# Patient Record
Sex: FEMALE | Race: ASIAN | NOT HISPANIC OR LATINO | URBAN - METROPOLITAN AREA
[De-identification: names, ages, dates, MRNs, and addresses within clinical notes are randomized per-mention and may not be internally consistent; named-entity substitution may affect disease eponyms.]

---

## 2022-07-19 ENCOUNTER — OUTPATIENT (OUTPATIENT)
Dept: OUTPATIENT SERVICES | Facility: HOSPITAL | Age: 62
LOS: 1 days | End: 2022-07-19
Payer: COMMERCIAL

## 2022-07-19 VITALS
SYSTOLIC BLOOD PRESSURE: 160 MMHG | WEIGHT: 145.95 LBS | HEIGHT: 60 IN | TEMPERATURE: 99 F | OXYGEN SATURATION: 97 % | HEART RATE: 73 BPM | RESPIRATION RATE: 16 BRPM | DIASTOLIC BLOOD PRESSURE: 89 MMHG

## 2022-07-19 DIAGNOSIS — E11.9 TYPE 2 DIABETES MELLITUS WITHOUT COMPLICATIONS: ICD-10-CM

## 2022-07-19 DIAGNOSIS — D48.1 NEOPLASM OF UNCERTAIN BEHAVIOR OF CONNECTIVE AND OTHER SOFT TISSUE: ICD-10-CM

## 2022-07-19 DIAGNOSIS — Z01.818 ENCOUNTER FOR OTHER PREPROCEDURAL EXAMINATION: ICD-10-CM

## 2022-07-19 DIAGNOSIS — M25.774 OSTEOPHYTE, RIGHT FOOT: ICD-10-CM

## 2022-07-19 DIAGNOSIS — F41.9 ANXIETY DISORDER, UNSPECIFIED: ICD-10-CM

## 2022-07-19 DIAGNOSIS — I10 ESSENTIAL (PRIMARY) HYPERTENSION: ICD-10-CM

## 2022-07-19 LAB
ANION GAP SERPL CALC-SCNC: 11 MMOL/L — SIGNIFICANT CHANGE UP (ref 5–17)
BUN SERPL-MCNC: 17 MG/DL — SIGNIFICANT CHANGE UP (ref 7–23)
CALCIUM SERPL-MCNC: 9.7 MG/DL — SIGNIFICANT CHANGE UP (ref 8.4–10.5)
CHLORIDE SERPL-SCNC: 103 MMOL/L — SIGNIFICANT CHANGE UP (ref 96–108)
CO2 SERPL-SCNC: 26 MMOL/L — SIGNIFICANT CHANGE UP (ref 22–31)
CREAT SERPL-MCNC: 0.78 MG/DL — SIGNIFICANT CHANGE UP (ref 0.5–1.3)
EGFR: 86 ML/MIN/1.73M2 — SIGNIFICANT CHANGE UP
GLUCOSE SERPL-MCNC: 76 MG/DL — SIGNIFICANT CHANGE UP (ref 70–99)
HCT VFR BLD CALC: 35.7 % — SIGNIFICANT CHANGE UP (ref 34.5–45)
HGB BLD-MCNC: 12.4 G/DL — SIGNIFICANT CHANGE UP (ref 11.5–15.5)
MCHC RBC-ENTMCNC: 33 PG — SIGNIFICANT CHANGE UP (ref 27–34)
MCHC RBC-ENTMCNC: 34.7 GM/DL — SIGNIFICANT CHANGE UP (ref 32–36)
MCV RBC AUTO: 94.9 FL — SIGNIFICANT CHANGE UP (ref 80–100)
NRBC # BLD: 0 /100 WBCS — SIGNIFICANT CHANGE UP (ref 0–0)
PLATELET # BLD AUTO: 279 K/UL — SIGNIFICANT CHANGE UP (ref 150–400)
POTASSIUM SERPL-MCNC: 3.7 MMOL/L — SIGNIFICANT CHANGE UP (ref 3.5–5.3)
POTASSIUM SERPL-SCNC: 3.7 MMOL/L — SIGNIFICANT CHANGE UP (ref 3.5–5.3)
RBC # BLD: 3.76 M/UL — LOW (ref 3.8–5.2)
RBC # FLD: 12.6 % — SIGNIFICANT CHANGE UP (ref 10.3–14.5)
SODIUM SERPL-SCNC: 140 MMOL/L — SIGNIFICANT CHANGE UP (ref 135–145)
WBC # BLD: 7.96 K/UL — SIGNIFICANT CHANGE UP (ref 3.8–10.5)
WBC # FLD AUTO: 7.96 K/UL — SIGNIFICANT CHANGE UP (ref 3.8–10.5)

## 2022-07-19 PROCEDURE — 80048 BASIC METABOLIC PNL TOTAL CA: CPT

## 2022-07-19 PROCEDURE — G0463: CPT

## 2022-07-19 PROCEDURE — 93010 ELECTROCARDIOGRAM REPORT: CPT | Mod: NC

## 2022-07-19 PROCEDURE — 93005 ELECTROCARDIOGRAM TRACING: CPT

## 2022-07-19 PROCEDURE — 85027 COMPLETE CBC AUTOMATED: CPT

## 2022-07-19 PROCEDURE — 36415 COLL VENOUS BLD VENIPUNCTURE: CPT

## 2022-07-19 RX ORDER — TELMISARTAN 20 MG/1
1 TABLET ORAL
Qty: 0 | Refills: 0 | DISCHARGE

## 2022-07-19 NOTE — H&P PST ADULT - FALL HARM RISK - UNIVERSAL INTERVENTIONS
Bed in lowest position, wheels locked, appropriate side rails in place/Call bell, personal items and telephone in reach/Instruct patient to call for assistance before getting out of bed or chair/Non-slip footwear when patient is out of bed/[place] to call system/Physically safe environment - no spills, clutter or unnecessary equipment/Purposeful Proactive Rounding/Room/bathroom lighting operational, light cord in reach

## 2022-07-19 NOTE — H&P PST ADULT - LAST STRESS TEST
Health Maintenance Due   Topic Date Due   • Diabetes Eye Exam  01/06/1964   • DTaP/Tdap/Td Vaccine (1 - Tdap) 01/06/1965   • Shingles Vaccine (1 of 2) 01/06/1996   • Breast Cancer Screening  03/25/2018   • Colorectal Cancer Screening-Fecal Occult Blood  05/15/2018   • Diabetes Foot Exam  05/18/2018   • Lung Cancer Screening  10/19/2018   • Medicare Wellness 65+  04/27/2019       Patient is due for the topics as listed above and wishes to discuss with provider.  Order is already placed for FOBT and Mammogram.  
Subjective:      Shani Braswell is a 72 year old female who presents for evaluation of symptoms of a URI, possible sinusitis, bronchitis . Symptoms include congestion, nasal congestion, no  fever and productive cough with  green colored sputum. Onset of symptoms was 1 weeks ago, and has been been unchanged since that time. Treatment to date: cough drops .    Patient's medications, allergies, past medical, surgical, social and family histories were reviewed and updated as appropriate.  Continues to smoke   Ears feels clogged and cannot hear     Review of Systems  No fevers overnight  No chest pain or shortness of breath    Objective:     Visit Vitals  /72   Pulse 66   Temp 97.4 °F (36.3 °C) (Oral)   Ht 5' 2\" (1.575 m)   Wt 82.2 kg   SpO2 96%   BMI 33.15 kg/m²     General appearance: alert  Ears: abnormal tympanic membrane right ear - erythematous, dull and retracted and abnormal tympanic membrane left ear - diminished mobility, erythematous, dull and retracted  Nose: Nares normal. Septum midline. Mucosa normal. No drainage or sinus tenderness.  Throat: lips, mucosa, and tongue normal; teeth and gums normal  Neck: mild anterior cervical adenopathy, no carotid bruit, no JVD, supple, symmetrical, trachea midline and thyroid not enlarged, symmetric, no tenderness/mass/nodules  Lungs: clear to auscultation bilaterally  Heart: regular rate and rhythm, S1, S2 normal, no murmur, click, rub or gallop    Assessment:     otitis media, sinusitis and viral upper respiratory illness  Allergy to pcn ( throat closed up )  Plan:   zpak   mucinex   Suggested symptomatic OTC remedies.  Nasal saline spray for congestion.  Follow up as needed.  
denies

## 2022-07-19 NOTE — H&P PST ADULT - HISTORY OF PRESENT ILLNESS
61yo female with medical h/o HTN, HDL and T2DM, reports Foot mass, right and presents today for PST for scheduled Excision Soft Tissue Mass Right Foot on 7/29/2022

## 2022-07-19 NOTE — H&P PST ADULT - PROBLEM SELECTOR PLAN 1
Pre-op instructions given. Pt verbalized understanding  Chlorhexidine wash instructions given  Accu-Chek ordered STAT for day of procedure  Pending: M/C   Pending: Covidpcr test/results

## 2022-07-19 NOTE — H&P PST ADULT - VENOUS THROMBOEMBOLISM CURRENT STATUS
No
(1) other risk factor (includes escalating BMI, pack-years of smoking, diabetes requiring insulin, chemotherapy, female gender and length of surgery)

## 2022-07-19 NOTE — H&P PST ADULT - NSICDXPASTMEDICALHX_GEN_ALL_CORE_FT
PAST MEDICAL HISTORY:  Foot mass, right     Hyperlipidemia     Hypertension     Type 2 diabetes mellitus

## 2022-07-28 ENCOUNTER — TRANSCRIPTION ENCOUNTER (OUTPATIENT)
Age: 62
End: 2022-07-28

## 2022-07-29 ENCOUNTER — TRANSCRIPTION ENCOUNTER (OUTPATIENT)
Age: 62
End: 2022-07-29

## 2022-07-29 ENCOUNTER — RESULT REVIEW (OUTPATIENT)
Age: 62
End: 2022-07-29

## 2022-07-29 ENCOUNTER — OUTPATIENT (OUTPATIENT)
Dept: OUTPATIENT SERVICES | Facility: HOSPITAL | Age: 62
LOS: 1 days | End: 2022-07-29
Payer: COMMERCIAL

## 2022-07-29 VITALS
DIASTOLIC BLOOD PRESSURE: 84 MMHG | RESPIRATION RATE: 15 BRPM | WEIGHT: 145.95 LBS | TEMPERATURE: 98 F | OXYGEN SATURATION: 98 % | HEART RATE: 66 BPM | SYSTOLIC BLOOD PRESSURE: 166 MMHG | HEIGHT: 60 IN

## 2022-07-29 VITALS
RESPIRATION RATE: 15 BRPM | TEMPERATURE: 98 F | HEART RATE: 64 BPM | OXYGEN SATURATION: 96 % | DIASTOLIC BLOOD PRESSURE: 73 MMHG | SYSTOLIC BLOOD PRESSURE: 138 MMHG

## 2022-07-29 DIAGNOSIS — M25.774 OSTEOPHYTE, RIGHT FOOT: ICD-10-CM

## 2022-07-29 DIAGNOSIS — D48.1 NEOPLASM OF UNCERTAIN BEHAVIOR OF CONNECTIVE AND OTHER SOFT TISSUE: ICD-10-CM

## 2022-07-29 LAB — GLUCOSE BLDC GLUCOMTR-MCNC: 129 MG/DL — HIGH (ref 70–99)

## 2022-07-29 PROCEDURE — 88311 DECALCIFY TISSUE: CPT | Mod: 26

## 2022-07-29 PROCEDURE — 88304 TISSUE EXAM BY PATHOLOGIST: CPT

## 2022-07-29 PROCEDURE — 28104 REMOVAL OF FOOT LESION: CPT | Mod: RT

## 2022-07-29 PROCEDURE — 73620 X-RAY EXAM OF FOOT: CPT

## 2022-07-29 PROCEDURE — 88311 DECALCIFY TISSUE: CPT

## 2022-07-29 PROCEDURE — 64708 REVISE ARM/LEG NERVE: CPT | Mod: RT

## 2022-07-29 PROCEDURE — 88304 TISSUE EXAM BY PATHOLOGIST: CPT | Mod: 26

## 2022-07-29 PROCEDURE — 82962 GLUCOSE BLOOD TEST: CPT

## 2022-07-29 PROCEDURE — 73620 X-RAY EXAM OF FOOT: CPT | Mod: 26,RT

## 2022-07-29 PROCEDURE — 76000 FLUOROSCOPY <1 HR PHYS/QHP: CPT

## 2022-07-29 DEVICE — SCREW CORTEX S-T 2.7X14MM: Type: IMPLANTABLE DEVICE | Status: FUNCTIONAL

## 2022-07-29 DEVICE — SCREW CORTEX 2.0 6MM: Type: IMPLANTABLE DEVICE | Status: FUNCTIONAL

## 2022-07-29 DEVICE — SCREW 2.0MM CORTEX 24MM: Type: IMPLANTABLE DEVICE | Status: FUNCTIONAL

## 2022-07-29 DEVICE — SCREW CORTEX S-T 2.7X6MM: Type: IMPLANTABLE DEVICE | Status: FUNCTIONAL

## 2022-07-29 DEVICE — QUICKANCHOR MICRO 3-0: Type: IMPLANTABLE DEVICE | Status: FUNCTIONAL

## 2022-07-29 DEVICE — SCREW CORTEX S-T 2.7X28MM: Type: IMPLANTABLE DEVICE | Status: FUNCTIONAL

## 2022-07-29 DEVICE — SCREW CORTEX S-T 2.7X20MM: Type: IMPLANTABLE DEVICE | Status: FUNCTIONAL

## 2022-07-29 DEVICE — SCREW MOD FOOT 2.0 CORT 36MM: Type: IMPLANTABLE DEVICE | Status: FUNCTIONAL

## 2022-07-29 DEVICE — SCREW CORT SELF TAP 2MM 16MM: Type: IMPLANTABLE DEVICE | Status: FUNCTIONAL

## 2022-07-29 DEVICE — SCREW CORT S-T 2X10MM: Type: IMPLANTABLE DEVICE | Status: FUNCTIONAL

## 2022-07-29 DEVICE — SCREW CORTEX S-T 2.7X12MM: Type: IMPLANTABLE DEVICE | Status: FUNCTIONAL

## 2022-07-29 DEVICE — SCREW CORTEX S-T 2.7X40MM: Type: IMPLANTABLE DEVICE | Status: FUNCTIONAL

## 2022-07-29 DEVICE — WIRE K DL TRC 0.062X6IN NS: Type: IMPLANTABLE DEVICE | Status: FUNCTIONAL

## 2022-07-29 DEVICE — WIRE K THRD TRC 0.045X6IN NS: Type: IMPLANTABLE DEVICE | Status: FUNCTIONAL

## 2022-07-29 DEVICE — IMP SYS PEEK FOREFOOT IB: Type: IMPLANTABLE DEVICE | Status: FUNCTIONAL

## 2022-07-29 DEVICE — SCREW CORT S-T 2X20MM: Type: IMPLANTABLE DEVICE | Status: FUNCTIONAL

## 2022-07-29 DEVICE — SCREW CORT S-T 2X22MM: Type: IMPLANTABLE DEVICE | Status: FUNCTIONAL

## 2022-07-29 DEVICE — SCREW CORTEX S-T 2.7X30MM: Type: IMPLANTABLE DEVICE | Status: FUNCTIONAL

## 2022-07-29 DEVICE — SCREW CORTEX S-T 2.7X22MM: Type: IMPLANTABLE DEVICE | Status: FUNCTIONAL

## 2022-07-29 DEVICE — SCREW CORTEX S-T 2.7X10MM: Type: IMPLANTABLE DEVICE | Status: FUNCTIONAL

## 2022-07-29 DEVICE — SCREW MOD FOOT 2.0 CORT 40MM: Type: IMPLANTABLE DEVICE | Status: FUNCTIONAL

## 2022-07-29 DEVICE — SCREW CORT S-T 2X14MM: Type: IMPLANTABLE DEVICE | Status: FUNCTIONAL

## 2022-07-29 DEVICE — SCREW CORTEX S-T 2.7X8MM: Type: IMPLANTABLE DEVICE | Status: FUNCTIONAL

## 2022-07-29 DEVICE — SCREW CORTEX S-T 2.7X16MM: Type: IMPLANTABLE DEVICE | Status: FUNCTIONAL

## 2022-07-29 DEVICE — SCREW MOD FOOT 2.0 CORT 26MM: Type: IMPLANTABLE DEVICE | Status: FUNCTIONAL

## 2022-07-29 DEVICE — SCREW CORTEX S-T 2.7X26MM: Type: IMPLANTABLE DEVICE | Status: FUNCTIONAL

## 2022-07-29 DEVICE — SCREW CORTEX S-T 2.7X34MM: Type: IMPLANTABLE DEVICE | Status: FUNCTIONAL

## 2022-07-29 DEVICE — SCREW CORTEX S-T 2.7X36MM: Type: IMPLANTABLE DEVICE | Status: FUNCTIONAL

## 2022-07-29 DEVICE — SCREW CORTEX S-T 2.7X18MM: Type: IMPLANTABLE DEVICE | Status: FUNCTIONAL

## 2022-07-29 DEVICE — SCREW CORT S-T 2X18MM: Type: IMPLANTABLE DEVICE | Status: FUNCTIONAL

## 2022-07-29 DEVICE — SCREW CORTEX S-T 2.7X24MM: Type: IMPLANTABLE DEVICE | Status: FUNCTIONAL

## 2022-07-29 DEVICE — SCREW MOD FOOT 2.0 CORT 38MM: Type: IMPLANTABLE DEVICE | Status: FUNCTIONAL

## 2022-07-29 DEVICE — SCREW CORTEX S-T 2.7X32MM: Type: IMPLANTABLE DEVICE | Status: FUNCTIONAL

## 2022-07-29 DEVICE — SCREW MOD FOOT 2.0 CORT 30MM: Type: IMPLANTABLE DEVICE | Status: FUNCTIONAL

## 2022-07-29 DEVICE — ULTRAFIX QUIKANCHR+ ETHBND 2-0: Type: IMPLANTABLE DEVICE | Status: FUNCTIONAL

## 2022-07-29 DEVICE — SCREW MOD FOOT 2.0 CORT 32MM: Type: IMPLANTABLE DEVICE | Status: FUNCTIONAL

## 2022-07-29 DEVICE — SCREW CORTEX SELF TAPING 2.0 X 28MM: Type: IMPLANTABLE DEVICE | Status: FUNCTIONAL

## 2022-07-29 DEVICE — SCREW CORTEX S-T 2.7X38MM: Type: IMPLANTABLE DEVICE | Status: FUNCTIONAL

## 2022-07-29 DEVICE — SCREW MOD FOOT 2.0 CORT 34MM: Type: IMPLANTABLE DEVICE | Status: FUNCTIONAL

## 2022-07-29 DEVICE — WIRE K DL TRC 0.035X4IN SS: Type: IMPLANTABLE DEVICE | Status: FUNCTIONAL

## 2022-07-29 RX ORDER — ATORVASTATIN CALCIUM 80 MG/1
1 TABLET, FILM COATED ORAL
Qty: 0 | Refills: 0 | DISCHARGE

## 2022-07-29 RX ORDER — SODIUM CHLORIDE 9 MG/ML
1000 INJECTION, SOLUTION INTRAVENOUS
Refills: 0 | Status: DISCONTINUED | OUTPATIENT
Start: 2022-07-29 | End: 2022-07-29

## 2022-07-29 RX ORDER — ONDANSETRON 8 MG/1
4 TABLET, FILM COATED ORAL ONCE
Refills: 0 | Status: DISCONTINUED | OUTPATIENT
Start: 2022-07-29 | End: 2022-07-29

## 2022-07-29 RX ORDER — HYDROMORPHONE HYDROCHLORIDE 2 MG/ML
0.5 INJECTION INTRAMUSCULAR; INTRAVENOUS; SUBCUTANEOUS
Refills: 0 | Status: DISCONTINUED | OUTPATIENT
Start: 2022-07-29 | End: 2022-07-29

## 2022-07-29 RX ORDER — METFORMIN HYDROCHLORIDE 850 MG/1
1 TABLET ORAL
Qty: 0 | Refills: 0 | DISCHARGE

## 2022-07-29 RX ORDER — TELMISARTAN AND HYDROCHLOROTHIAZIDE 40; 12.5 MG/1; MG/1
1 TABLET ORAL
Qty: 0 | Refills: 0 | DISCHARGE

## 2022-07-29 RX ORDER — MULTIVIT-MIN/FERROUS GLUCONATE 9 MG/15 ML
1 LIQUID (ML) ORAL
Qty: 0 | Refills: 0 | DISCHARGE

## 2022-07-29 RX ORDER — HYDROMORPHONE HYDROCHLORIDE 2 MG/ML
1 INJECTION INTRAMUSCULAR; INTRAVENOUS; SUBCUTANEOUS
Refills: 0 | Status: DISCONTINUED | OUTPATIENT
Start: 2022-07-29 | End: 2022-07-29

## 2022-07-29 RX ADMIN — SODIUM CHLORIDE 50 MILLILITER(S): 9 INJECTION, SOLUTION INTRAVENOUS at 06:55

## 2022-07-29 NOTE — BRIEF OPERATIVE NOTE - NSICDXBRIEFPOSTOP_GEN_ALL_CORE_FT
POST-OP DIAGNOSIS:  Exostosis of right foot 29-Jul-2022 09:09:44  Amarilys Dunn  Neuritis of right foot 29-Jul-2022 09:09:55  Amarilys Dunn

## 2022-07-29 NOTE — ASU PATIENT PROFILE, ADULT - FALL HARM RISK - UNIVERSAL INTERVENTIONS
Bed in lowest position, wheels locked, appropriate side rails in place/Call bell, personal items and telephone in reach/Instruct patient to call for assistance before getting out of bed or chair/Non-slip footwear when patient is out of bed/[place] to call system/Physically safe environment - no spills, clutter or unnecessary equipment/Purposeful Proactive Rounding/Room/bathroom lighting operational, light cord in reach

## 2022-07-29 NOTE — ASU DISCHARGE PLAN (ADULT/PEDIATRIC) - NS MD DC FALL RISK RISK
For information on Fall & Injury Prevention, visit: [email or web address] OR  [email or web address] OR  https://www.cdc.gov/steadi/patient.html

## 2022-07-29 NOTE — BRIEF OPERATIVE NOTE - NSICDXBRIEFPREOP_GEN_ALL_CORE_FT
PRE-OP DIAGNOSIS:  Exostosis of right foot 29-Jul-2022 09:09:34  Amarilys Dunn  Neuritis of foot, right 29-Jul-2022 09:10:10  Amarilys Dunn

## 2022-08-05 LAB — SURGICAL PATHOLOGY STUDY: SIGNIFICANT CHANGE UP

## 2022-10-25 PROBLEM — E78.5 HYPERLIPIDEMIA, UNSPECIFIED: Chronic | Status: ACTIVE | Noted: 2022-07-19

## 2022-10-25 PROBLEM — E11.9 TYPE 2 DIABETES MELLITUS WITHOUT COMPLICATIONS: Chronic | Status: ACTIVE | Noted: 2022-07-19

## 2022-10-25 PROBLEM — R22.41 LOCALIZED SWELLING, MASS AND LUMP, RIGHT LOWER LIMB: Chronic | Status: ACTIVE | Noted: 2022-07-19

## 2022-10-25 PROBLEM — I10 ESSENTIAL (PRIMARY) HYPERTENSION: Chronic | Status: ACTIVE | Noted: 2022-07-19

## 2022-11-11 ENCOUNTER — APPOINTMENT (OUTPATIENT)
Dept: UROGYNECOLOGY | Facility: CLINIC | Age: 62
End: 2022-11-11

## 2022-11-11 VITALS
SYSTOLIC BLOOD PRESSURE: 145 MMHG | WEIGHT: 145 LBS | BODY MASS INDEX: 27.38 KG/M2 | HEIGHT: 61 IN | DIASTOLIC BLOOD PRESSURE: 82 MMHG

## 2022-11-11 DIAGNOSIS — R73.03 PREDIABETES.: ICD-10-CM

## 2022-11-11 DIAGNOSIS — E78.5 HYPERLIPIDEMIA, UNSPECIFIED: ICD-10-CM

## 2022-11-11 DIAGNOSIS — I10 ESSENTIAL (PRIMARY) HYPERTENSION: ICD-10-CM

## 2022-11-11 DIAGNOSIS — R82.90 UNSPECIFIED ABNORMAL FINDINGS IN URINE: ICD-10-CM

## 2022-11-11 PROBLEM — Z00.00 ENCOUNTER FOR PREVENTIVE HEALTH EXAMINATION: Status: ACTIVE | Noted: 2022-11-11

## 2022-11-11 LAB
BILIRUB UR QL STRIP: NORMAL
CLARITY UR: CLEAR
COLLECTION METHOD: NORMAL
GLUCOSE UR-MCNC: NORMAL
HCG UR QL: 0.2 EU/DL
HGB UR QL STRIP.AUTO: NORMAL
KETONES UR-MCNC: NORMAL
LEUKOCYTE ESTERASE UR QL STRIP: NORMAL
NITRITE UR QL STRIP: NORMAL
PH UR STRIP: 6.5
PROT UR STRIP-MCNC: NORMAL
SP GR UR STRIP: 1.01

## 2022-11-11 PROCEDURE — 99204 OFFICE O/P NEW MOD 45 MIN: CPT | Mod: 25

## 2022-11-11 PROCEDURE — 51701 INSERT BLADDER CATHETER: CPT

## 2022-11-11 RX ORDER — METFORMIN HYDROCHLORIDE 500 MG/1
500 TABLET, COATED ORAL
Refills: 0 | Status: ACTIVE | COMMUNITY

## 2022-11-11 RX ORDER — ATORVASTATIN CALCIUM 40 MG/1
40 TABLET, FILM COATED ORAL
Refills: 0 | Status: ACTIVE | COMMUNITY

## 2022-11-11 RX ORDER — TELMISARTAN AND HYDROCHLOROTHIAZIDE 80; 12.5 MG/1; MG/1
80-12.5 TABLET ORAL
Refills: 0 | Status: ACTIVE | COMMUNITY

## 2022-11-11 NOTE — PROCEDURE
[Straight Catheterization] : insertion of a straight catheter [Other ___] : [unfilled] [Patient] : the patient [Allergies Reviewed] : Allergies reviewed [None] : none [___ Fr Straight Tip] : a [unfilled] in [place] straight tip catheter [FreeTextEntry1] : Sterile straight catheterization performed to obtain post void residual, which was 30 mL. Urine was clear, dipstick showed trace blood, therefore urinalysis and urine culture sent.

## 2022-11-11 NOTE — DISCUSSION/SUMMARY
[FreeTextEntry1] : Marah Goyal is a 61 yo with stage III POP s/p failed pessary trials with another provider. I counseled Marah on the risk factors and etiologies of pelvic organ prolapse. Computer generated images were used to demonstrate the degree of the patient's prolapse. We reviewed her options for management, which included continued observation, Kegels and/or pelvic floor physical therapy, repeat trial of pessary, and surgery. She is not interested in surgery at this stage and although has failed 4 prior pessaries, she would like to try one more time. She last had a Gellhorn SS 2 3/4. She will return for pessary fitting. \par \par Trace blood noted on urine dipstick, urinalysis and culture were collected and sent.

## 2022-11-11 NOTE — PHYSICAL EXAM
[FreeTextEntry1] : General: Well, appearing, no acute distress\par HEENT: Normocephalic, atraumatic\par Respiratory: Speaking in full sentences comfortably, normal work of breathing and no cough during visit\par Extremities: No upper extremity edema noted\par Skin: No obvious rash or skin lesions\par Neuro: Alert and oriented x 3, speech is fluent, normal rate\par Psych: Normal mood and affect [Mass (___ Cm)] : no ~M [unfilled] abdominal mass was palpated [Tenderness] : ~T no ~M abdominal tenderness observed [Distended] : not distended [Labia Majora] : were normal [Labia Minora] : were normal [Normal Appearance] : general appearance was normal [Atrophy] : atrophy [Dry Mucosa] : dry mucosa [Aa ____] : Aa [unfilled] [Ba ____] : Ba [unfilled] [C ____] : C [unfilled] [GH ____] : GH [unfilled] [PB ____] : PB [unfilled] [TVL ____] : TVL  [unfilled] [Ap ____] : Ap [unfilled] [Bp ____] : Bp [unfilled] [D ____] : D [unfilled] [Normal] : normal [Post Void Residual ____ml] : post void residual was [unfilled] ml [FreeTextEntry4] : Some scarring of the posterior vaginal epithelium noted.

## 2022-11-11 NOTE — HISTORY OF PRESENT ILLNESS
[FreeTextEntry1] : STEPHANIE WALLER  is a 62 year old P2 with pre-DM, HTN and HLD presenting for evaluation of prolapse. Symptoms started 2 years ago. Initially didn't feel it but noticed by her . Later started protruding more and became bothersome. At times causes hesitancy with urination, but overall feels that she empties well. She was seen by urologist and tried pessary x 4 (including ring with support and Gellhorn) which all fell out. She reports a history of chronic constipation, which she feels may have contributed to the prolapse. Currently has been managing with diet, prune juice and occasionally MiraLAX. She feels that it is under control. She has rare stress urinary incontinence (when she has seasonal allergies) that she states is "not really an issues". She denies any urgency incontinence. \par \par Last pap 10/11/22 - normal per patient, no h/o abnormal pap smears.\par Had last colonoscopy within last 5 years, reports had polyps that were removed and states that she is not yet due to follow-up.

## 2022-11-14 LAB
APPEARANCE: CLEAR
BACTERIA UR CULT: NORMAL
BACTERIA: NEGATIVE
BILIRUBIN URINE: NEGATIVE
BLOOD URINE: NEGATIVE
COLOR: COLORLESS
GLUCOSE QUALITATIVE U: NEGATIVE
HYALINE CASTS: 4 /LPF
KETONES URINE: NEGATIVE
LEUKOCYTE ESTERASE URINE: NEGATIVE
MICROSCOPIC-UA: NORMAL
NITRITE URINE: NEGATIVE
PH URINE: 6.5
PROTEIN URINE: NEGATIVE
RED BLOOD CELLS URINE: 0 /HPF
SPECIFIC GRAVITY URINE: 1.01
SQUAMOUS EPITHELIAL CELLS: 1 /HPF
UROBILINOGEN URINE: NORMAL
WHITE BLOOD CELLS URINE: 0 /HPF

## 2022-12-13 ENCOUNTER — APPOINTMENT (OUTPATIENT)
Dept: UROGYNECOLOGY | Facility: CLINIC | Age: 62
End: 2022-12-13

## 2022-12-13 PROCEDURE — A4561: CPT

## 2022-12-13 PROCEDURE — 57160 INSERT PESSARY/OTHER DEVICE: CPT

## 2022-12-27 ENCOUNTER — APPOINTMENT (OUTPATIENT)
Dept: UROGYNECOLOGY | Facility: CLINIC | Age: 62
End: 2022-12-27

## 2022-12-27 PROCEDURE — 99213 OFFICE O/P EST LOW 20 MIN: CPT

## 2022-12-27 NOTE — HISTORY OF PRESENT ILLNESS
[FreeTextEntry1] : Stage III POP fitted with GH 3' pessary last visit. Very motivated to avoid surgery.

## 2022-12-27 NOTE — DISCUSSION/SUMMARY
[FreeTextEntry1] : Pt with stage III POP refitted with 2 3/4 pessary today, good fit.\par Small superficial irritation of posterior vaginal wall.  Discussed starting vaginal estrogen cream and Rx sent to pharmacy. Instruction for use given.\par RTO in 2 weeks. Aware to call with any questions or concerns.

## 2022-12-27 NOTE — PHYSICAL EXAM
[Normal] : normal external genitalia [Atrophy] : atrophy [FreeTextEntry1] : General: Well, appearing, no acute distress\par HEENT: Normocephalic, atraumatic\par Respiratory: Speaking in full sentences comfortably, normal work of breathing and no cough during visit\par Extremities: No upper extremity edema noted\par Skin: No obvious rash or skin lesions\par Neuro: Alert and oriented x 3, speech is fluent, normal rate\par Psych: Normal mood and affect\par  [FreeTextEntry4] : Small irritation of posterior vaginal wall.

## 2022-12-27 NOTE — PROCEDURE
[Good Fit] : fits well [Refit] : refit needed [Pessary Inserted] : inserted [Mild] : mild bleeding [Erosion] : no evidence of erosion [Erythema] : no erythema [Discharge] : no vaginal discharge [Infection] : no evidence of infection [FreeTextEntry1] : 2 3/4 [de-identified] : From 3' [de-identified] : Scant bleeding from superficial epithelial irritation [FreeTextEntry3] : Will start vaginal estrogen cream

## 2023-01-31 ENCOUNTER — APPOINTMENT (OUTPATIENT)
Dept: UROGYNECOLOGY | Facility: CLINIC | Age: 63
End: 2023-01-31
Payer: COMMERCIAL

## 2023-01-31 PROCEDURE — A4562: CPT

## 2023-01-31 PROCEDURE — 99214 OFFICE O/P EST MOD 30 MIN: CPT

## 2023-01-31 RX ORDER — SENNOSIDES 8.6 MG TABLETS 8.6 MG/1
8.6 TABLET ORAL
Qty: 30 | Refills: 3 | Status: ACTIVE | COMMUNITY
Start: 2023-01-31 | End: 1900-01-01

## 2023-01-31 NOTE — PHYSICAL EXAM
[FreeTextEntry1] : General: Well, appearing, no acute distress\par HEENT: Normocephalic, atraumatic\par Respiratory: Speaking in full sentences comfortably, normal work of breathing and no cough during visit\par Extremities: No upper extremity edema noted\par Skin: No obvious rash or skin lesions\par Neuro: Alert and oriented x 3, speech is fluent, normal rate\par Psych: Normal mood and affect\par  [Normal] : normal external genitalia [Atrophy] : atrophy [FreeTextEntry4] : Healing erosion of the anterior vaginal wall, no bleeding

## 2023-01-31 NOTE — DISCUSSION/SUMMARY
[FreeTextEntry1] : #POP/vaginal atrophy:\par - Pt with stage III POP, Gellhorn 2 3/4 pessary replaced today.\par - Continue current management\par - Continue vaginal estrogen cream 3 times per week\par \par #Constipation\par - Rx for Senna sent to pharmacy\par - Recommend both Senna and MiraLAX daily, to adjust dose as needed based on stool consistency\par \par RTO in 6 weeks or earlier PRN. She is aware to call the office with any issues or concerns.

## 2023-01-31 NOTE — HISTORY OF PRESENT ILLNESS
[FreeTextEntry1] : Stage III POP refitted with Gellhorn 2 3/4 last visit. Since then, pessary fell out 2 weeks ago after a bout of coughing and straining due to chronic constipation. Uses MiraLAX on/off for constipation. Otherwise doing well, no complaints at this time.

## 2023-01-31 NOTE — PROCEDURE
[Good Fit] : fits well [Refit] : refit is not needed [Erosion] : no evidence of erosion [Erythema] : no erythema [Discharge] : no vaginal discharge [Infection] : no evidence of infection [Pessary Inserted] : inserted [None] : no bleeding [Medication Review] : Medicaiton Review: Patient verbalizes understanding of risks and benefits [FreeTextEntry1] : 2 3/4 [de-identified] : Scant bleeding from superficial epithelial irritation [FreeTextEntry3] : Continue vaginal estrogen cream

## 2023-02-07 ENCOUNTER — APPOINTMENT (OUTPATIENT)
Dept: UROGYNECOLOGY | Facility: CLINIC | Age: 63
End: 2023-02-07
Payer: COMMERCIAL

## 2023-02-07 PROCEDURE — 99213 OFFICE O/P EST LOW 20 MIN: CPT

## 2023-02-07 NOTE — HISTORY OF PRESENT ILLNESS
[FreeTextEntry1] : Stage III POP managed with #2 3/4. She returns today reporting that pessary fell out a few days ago due to coughing. She previously had some constipation, which has improved with MiraLAX. I had also recommended senna, but she has not yet filled this prescription. Since pessary has been out she reports discomfort.

## 2023-02-07 NOTE — PROCEDURE
[Good Fit] : fits well [Pessary Inserted] : inserted [None] : no bleeding [Medication Review] : Medicaiton Review: Patient verbalizes understanding of risks and benefits [Refit] : refit needed [Erosion] : no evidence of erosion [Erythema] : no erythema [Discharge] : no vaginal discharge [Infection] : no evidence of infection [FreeTextEntry1] : 2 1/2 [de-identified] : f [FreeTextEntry3] : Continue vaginal estrogen cream

## 2023-02-07 NOTE — DISCUSSION/SUMMARY
[FreeTextEntry1] : #POP/vaginal atrophy:\par - Pt with stage III POP, Gellhorn 2 1/2 pessary refitted today.\par - Continue current management\par - Continue vaginal estrogen cream 3 times per week\par \par RTO in 6 weeks or earlier PRN. She is aware to call the office with any issues or concerns.

## 2023-03-14 ENCOUNTER — APPOINTMENT (OUTPATIENT)
Dept: UROGYNECOLOGY | Facility: CLINIC | Age: 63
End: 2023-03-14
Payer: COMMERCIAL

## 2023-03-14 VITALS
DIASTOLIC BLOOD PRESSURE: 58 MMHG | HEART RATE: 63 BPM | BODY MASS INDEX: 25.49 KG/M2 | SYSTOLIC BLOOD PRESSURE: 126 MMHG | HEIGHT: 61 IN | WEIGHT: 135 LBS | OXYGEN SATURATION: 99 %

## 2023-03-14 PROCEDURE — 99213 OFFICE O/P EST LOW 20 MIN: CPT

## 2023-03-14 NOTE — PROCEDURE
[Good Fit] : fits well [Refit] : refit is not needed [Erosion] : no evidence of erosion [Erythema] : no erythema [Discharge] : no vaginal discharge [Infection] : no evidence of infection [Pessary Inserted] : inserted [Pessary Washed] : washed [None] : no bleeding [Medication Review] : Medicaiton Review: Patient verbalizes understanding of risks and benefits [FreeTextEntry1] : 2 1/2 [FreeTextEntry3] : Continue vaginal estrogen cream

## 2023-03-14 NOTE — HISTORY OF PRESENT ILLNESS
[FreeTextEntry1] : Doing well with current pessary, refitted last visit with a  LS 2 /12, which has been providing good support. Has been using MiraLAX intermittently for constipation.

## 2023-03-14 NOTE — PHYSICAL EXAM
[No Acute Distress] : in no acute distress [Well developed] : well developed [Well Nourished] : ~L well nourished [Good Hygeine] : demonstrates good hygeine [Oriented x3] : oriented to person, place, and time [Normal Memory] : ~T memory was ~L unimpaired [Normal Mood/Affect] : mood and affect are normal [Anxiety] : patient is not anxious [Respirations regular] : ~T respiratory rate was regular [Normal] : normal external genitalia [Normal Appearance] : general appearance was normal [Atrophy] : atrophy [FreeTextEntry4] : No erosions

## 2023-03-14 NOTE — DISCUSSION/SUMMARY
[FreeTextEntry1] : #POP/vaginal atrophy:\par - Pt with stage III POP, doing well with Gellhorn 2 1/2 pessary.\par - Continue current management.\par - Continue vaginal estrogen maintenance therapy\par - RTO in 3 months. She is aware to call the office with any issues or concerns.

## 2023-05-09 ENCOUNTER — APPOINTMENT (OUTPATIENT)
Dept: UROGYNECOLOGY | Facility: CLINIC | Age: 63
End: 2023-05-09
Payer: COMMERCIAL

## 2023-05-09 VITALS
DIASTOLIC BLOOD PRESSURE: 62 MMHG | HEIGHT: 61 IN | BODY MASS INDEX: 26.43 KG/M2 | SYSTOLIC BLOOD PRESSURE: 138 MMHG | WEIGHT: 140 LBS

## 2023-05-09 DIAGNOSIS — N89.8 OTHER SPECIFIED NONINFLAMMATORY DISORDERS OF VAGINA: ICD-10-CM

## 2023-05-09 PROCEDURE — 99213 OFFICE O/P EST LOW 20 MIN: CPT

## 2023-05-09 RX ORDER — CLOTRIMAZOLE AND BETAMETHASONE DIPROPIONATE 10; .5 MG/G; MG/G
1-0.05 CREAM TOPICAL TWICE DAILY
Qty: 1 | Refills: 0 | Status: ACTIVE | COMMUNITY
Start: 2023-05-09 | End: 1900-01-01

## 2023-05-11 LAB
CANDIDA VAG CYTO: NOT DETECTED
G VAGINALIS+PREV SP MTYP VAG QL MICRO: NOT DETECTED
T VAGINALIS VAG QL WET PREP: NOT DETECTED

## 2023-05-16 NOTE — HISTORY OF PRESENT ILLNESS
[FreeTextEntry1] : Marah is a 62 y/o with history of pelvic prolapse managed with a  LS 2 1/2. She reports that she started to have vaginal itching a few days ago. She denies any discharge or pelvic pain. She denies any urinary symptoms.

## 2023-05-16 NOTE — PROCEDURE
[Good Fit] : fits well [Pessary Inserted] : inserted [Pessary Washed] : washed [None] : no bleeding [Medication Review] : Medicaiton Review: Patient verbalizes understanding of risks and benefits [Refit] : refit is not needed [Erosion] : no evidence of erosion [Erythema] : no erythema [Discharge] : no vaginal discharge [Infection] : no evidence of infection [FreeTextEntry1] : 2 1/2 [FreeTextEntry3] : Continue vaginal estrogen cream [FreeTextEntry8] : Reviewed pericare

## 2023-05-16 NOTE — PHYSICAL EXAM
[No Acute Distress] : in no acute distress [Well developed] : well developed [Well Nourished] : ~L well nourished [Good Hygeine] : demonstrates good hygeine [Oriented x3] : oriented to person, place, and time [Normal Memory] : ~T memory was ~L unimpaired [Normal Mood/Affect] : mood and affect are normal [Respirations regular] : ~T respiratory rate was regular [Normal] : normal external genitalia [Normal Appearance] : general appearance was normal [Atrophy] : atrophy [Dry Mucosa] : dry mucosa [No Bleeding] : there was no active vaginal bleeding [Anxiety] : patient is not anxious

## 2023-05-16 NOTE — DISCUSSION/SUMMARY
[FreeTextEntry1] : Vaginal itching:\par Affirm done\par Reviewed pericare\par Tx started with Lotrisone\par \par POP/vaginal atrophy:\par - Pt with stage III POP, doing well with Gellhorn 2 1/2 pessary.\par - Continue current management.\par - Continue vaginal estrogen maintenance therapy\par - She has an appt with LAINE Morrison at the end of  the month\par \par Advised pt to call the office with any questions or concerns

## 2023-05-31 ENCOUNTER — APPOINTMENT (OUTPATIENT)
Dept: UROGYNECOLOGY | Facility: CLINIC | Age: 63
End: 2023-05-31
Payer: COMMERCIAL

## 2023-05-31 VITALS
HEIGHT: 61 IN | SYSTOLIC BLOOD PRESSURE: 155 MMHG | HEART RATE: 83 BPM | WEIGHT: 140 LBS | BODY MASS INDEX: 26.43 KG/M2 | DIASTOLIC BLOOD PRESSURE: 83 MMHG

## 2023-05-31 PROCEDURE — 99214 OFFICE O/P EST MOD 30 MIN: CPT

## 2023-05-31 NOTE — PHYSICAL EXAM
[No Acute Distress] : in no acute distress [Well developed] : well developed [Well Nourished] : ~L well nourished [Good Hygeine] : demonstrates good hygeine [Oriented x3] : oriented to person, place, and time [Normal Memory] : ~T memory was ~L unimpaired [Normal Mood/Affect] : mood and affect are normal [Respirations regular] : ~T respiratory rate was regular [Normal Appearance] : general appearance was normal [Atrophy] : atrophy [Anxiety] : patient is not anxious [Normal] : normal [FreeTextEntry4] : Small superficial erosion with scant bleeding in posterior vaginal wall

## 2023-05-31 NOTE — HISTORY OF PRESENT ILLNESS
[FreeTextEntry1] : Pt presents for pessary check. Last visit complained of vaginal itching. AFFIRM culture was negative. She was prescribed Lotrisone. She reports improvement of symptoms. Thinks it is from using panty liners daily. She is otherwise doing well and has no complaints.

## 2023-05-31 NOTE — DISCUSSION/SUMMARY
[FreeTextEntry1] : Doing well with pessary management of prolapse. She had a superficial erosion treated with silver nitrate today. We discussed self-care teaching as patient may be interested in resuming sexual intercourse with her . She would like to wait until next pessary check. We also discussed other alternatives to management of prolapse, but she is not interested in surgery. She will continue current management.\par Continue vaginal estrogen 3 times weekly, refill sent to pharmacy. \par Recommend Desitin ointment for occasional vulvar irritation. \par Patient is traveling to the [facility] for one month. \par Return for pessary check in 3 months - will teach self care at that time.

## 2023-05-31 NOTE — PROCEDURE
[Good Fit] : fits well [Pessary Inserted] : inserted [Pessary Washed] : washed [Medication Review] : Medicaiton Review: Patient verbalizes understanding of risks and benefits [Refit] : refit is not needed [Erosion] : evidence of erosion [Erythema] : no erythema [Discharge] : no vaginal discharge [Infection] : no evidence of infection [Mild] : mild bleeding [Hemostatic Agent used (Silver Nitrate)] : a hemostatic agent (Silver Nitrate) was used to resolve bleeding [FreeTextEntry1] : 2 1/2 [de-identified] : Superfical in posterior vaginal wall [FreeTextEntry3] : Continue vaginal estrogen cream three times weekly

## 2023-08-29 ENCOUNTER — APPOINTMENT (OUTPATIENT)
Dept: UROGYNECOLOGY | Facility: CLINIC | Age: 63
End: 2023-08-29
Payer: COMMERCIAL

## 2023-08-29 VITALS
HEIGHT: 61 IN | WEIGHT: 140 LBS | HEART RATE: 74 BPM | SYSTOLIC BLOOD PRESSURE: 146 MMHG | DIASTOLIC BLOOD PRESSURE: 82 MMHG | BODY MASS INDEX: 26.43 KG/M2

## 2023-08-29 PROCEDURE — 99214 OFFICE O/P EST MOD 30 MIN: CPT

## 2023-08-29 RX ORDER — ESTRADIOL 0.1 MG/G
0.1 CREAM VAGINAL
Qty: 1 | Refills: 3 | Status: ACTIVE | COMMUNITY
Start: 2022-12-27 | End: 1900-01-01

## 2023-08-29 NOTE — PHYSICAL EXAM
[No Acute Distress] : in no acute distress [Well developed] : well developed [Well Nourished] : ~L well nourished [Good Hygeine] : demonstrates good hygeine [Oriented x3] : oriented to person, place, and time [Normal Memory] : ~T memory was ~L unimpaired [Normal Mood/Affect] : mood and affect are normal [Anxiety] : patient is not anxious [Respirations regular] : ~T respiratory rate was regular [Normal Appearance] : general appearance was normal [Atrophy] : atrophy [Normal] : normal [FreeTextEntry4] : Small superficial erosion with scant bleeding in posterior vaginal wall

## 2023-08-29 NOTE — HISTORY OF PRESENT ILLNESS
[FreeTextEntry1] : Pt presents for pessary check. Reports that the pessary fell out due to constipation one week ago. Bowel movements have now improved with MiraLAX and dietary changes. She is otherwise doing well and has no complaints.

## 2023-08-29 NOTE — PROCEDURE
[Good Fit] : fits well [Refit] : refit is not needed [Erosion] : evidence of erosion [Erythema] : no erythema [Discharge] : no vaginal discharge [Infection] : no evidence of infection [Pessary Inserted] : inserted [Pessary Washed] : washed [Mild] : mild bleeding [Hemostatic Agent used (Silver Nitrate)] : a hemostatic agent (Silver Nitrate) was used to resolve bleeding [Medication Review] : Medicaiton Review: Patient verbalizes understanding of risks and benefits [Bowel Management] : Bowel Management: patient verbalizes understanding of daily dietary fiber intake [FreeTextEntry1] : 2 1/2 [de-identified] : Superfical in posterior vaginal wall [FreeTextEntry3] : Continue vaginal estrogen cream three times weekly [FreeTextEntry4] : Advised to use MiraLAX once daily.

## 2023-08-29 NOTE — DISCUSSION/SUMMARY
[FreeTextEntry1] : Doing well with pessary management of prolapse except for constipation, which caused pessary to fall out one week ago. She had a superficial erosion treated with silver nitrate today.  She will continue current management.  Emphasized importance of avoidance of constipation and recommended daily MiraLAX.  Continue vaginal estrogen 3 times weekly, refill sent to pharmacy.  Return for pessary check in 3 weeks - will teach self-care at that time (may be interested in resuming sexual activity with her ; previously declined surgical alternatives).

## 2023-09-19 ENCOUNTER — APPOINTMENT (OUTPATIENT)
Dept: UROGYNECOLOGY | Facility: CLINIC | Age: 63
End: 2023-09-19
Payer: COMMERCIAL

## 2023-09-19 VITALS
BODY MASS INDEX: 26.43 KG/M2 | HEIGHT: 61 IN | DIASTOLIC BLOOD PRESSURE: 80 MMHG | SYSTOLIC BLOOD PRESSURE: 138 MMHG | WEIGHT: 140 LBS

## 2023-09-19 PROCEDURE — 99213 OFFICE O/P EST LOW 20 MIN: CPT

## 2023-11-21 ENCOUNTER — APPOINTMENT (OUTPATIENT)
Dept: UROGYNECOLOGY | Facility: CLINIC | Age: 63
End: 2023-11-21
Payer: COMMERCIAL

## 2023-11-21 VITALS
HEART RATE: 74 BPM | HEIGHT: 61 IN | BODY MASS INDEX: 26.43 KG/M2 | DIASTOLIC BLOOD PRESSURE: 82 MMHG | SYSTOLIC BLOOD PRESSURE: 130 MMHG | WEIGHT: 140 LBS

## 2023-11-21 PROCEDURE — 99213 OFFICE O/P EST LOW 20 MIN: CPT

## 2024-03-12 ENCOUNTER — APPOINTMENT (OUTPATIENT)
Dept: UROGYNECOLOGY | Facility: CLINIC | Age: 64
End: 2024-03-12
Payer: COMMERCIAL

## 2024-03-12 VITALS
DIASTOLIC BLOOD PRESSURE: 70 MMHG | WEIGHT: 140 LBS | HEIGHT: 61 IN | HEART RATE: 55 BPM | BODY MASS INDEX: 26.43 KG/M2 | SYSTOLIC BLOOD PRESSURE: 180 MMHG

## 2024-03-12 DIAGNOSIS — K59.09 OTHER CONSTIPATION: ICD-10-CM

## 2024-03-12 DIAGNOSIS — N95.2 POSTMENOPAUSAL ATROPHIC VAGINITIS: ICD-10-CM

## 2024-03-12 PROCEDURE — 99213 OFFICE O/P EST LOW 20 MIN: CPT

## 2024-03-12 NOTE — PROCEDURE
[Good Fit] : fits well [Pessary Washed] : washed [Mild] : mild bleeding [Medication Review] : Medicaiton Review: Patient verbalizes understanding of risks and benefits [Bowel Management] : Bowel Management: patient verbalizes understanding of daily dietary fiber intake [Refit] : refit is not needed [Erosion] : no evidence of erosion [Erythema] : no erythema [Discharge] : no vaginal discharge [Infection] : no evidence of infection [Pessary Inserted] : inserted [FreeTextEntry1] : 2 1/2 [FreeTextEntry3] : Continue vaginal estrogen cream three times weekly [FreeTextEntry4] : Continue MiraLAX as needed

## 2024-03-12 NOTE — HISTORY OF PRESENT ILLNESS
[FreeTextEntry1] : Pt presents for pessary check. Pessary fell out 10 days ago while she was straining during a bowel movement. Reports constipation has been more significant recently. Attributes it to some dietary factors such as eating rice/bagel at same time. Reports that she is due for a colonoscopy and will schedule. Otherwise feels that the pessary has been working for her. She is considering surgery now, however, due to concerns for not being able to make it to appointments as she gets older (doesn't drive).

## 2024-03-12 NOTE — DISCUSSION/SUMMARY
[FreeTextEntry1] : Overall doing well with Gellhorn 2 1/2 pessary management of prolapse, however fell out due to constipation.  Pt would like to continue with current management. Continue vaginal estrogen 3 times week.  [facility] handout on surgical reconstruction provided as she is now considering this option. Recommend follow-up with GI for colonoscopy and management recommendations for constipation. Return for pessary check in 3 months. Aware to call the office with any questions or concerns.

## 2024-03-12 NOTE — PHYSICAL EXAM
[Chaperone Present] : A chaperone was present in the examining room during all aspects of the physical examination [No Acute Distress] : in no acute distress [Well developed] : well developed [Well Nourished] : ~L well nourished [Good Hygeine] : demonstrates good hygeine [Oriented x3] : oriented to person, place, and time [Normal Memory] : ~T memory was ~L unimpaired [Normal Mood/Affect] : mood and affect are normal [Respirations regular] : ~T respiratory rate was regular [Normal Appearance] : general appearance was normal [Atrophy] : atrophy [Normal] : normal [Anxiety] : patient is not anxious

## 2024-04-17 ENCOUNTER — APPOINTMENT (OUTPATIENT)
Dept: UROGYNECOLOGY | Facility: CLINIC | Age: 64
End: 2024-04-17
Payer: COMMERCIAL

## 2024-04-17 VITALS
DIASTOLIC BLOOD PRESSURE: 85 MMHG | HEIGHT: 61 IN | SYSTOLIC BLOOD PRESSURE: 154 MMHG | WEIGHT: 140 LBS | HEART RATE: 71 BPM | BODY MASS INDEX: 26.43 KG/M2

## 2024-04-17 PROCEDURE — 99213 OFFICE O/P EST LOW 20 MIN: CPT

## 2024-04-17 NOTE — PROCEDURE
[Good Fit] : fits well [Refit] : refit needed [Erosion] : no evidence of erosion [Erythema] : no erythema [Discharge] : no vaginal discharge [Infection] : no evidence of infection [Pessary Inserted] : inserted [Medication Review] : Medicaiton Review: Patient verbalizes understanding of risks and benefits [Bowel Management] : Bowel Management: patient verbalizes understanding of daily dietary fiber intake [FreeTextEntry1] : 2 3/4 [de-identified] : from 2 1/2 [FreeTextEntry3] : Continue vaginal estrogen cream three times weekly [FreeTextEntry4] : Continue MiraLAX as needed

## 2024-04-17 NOTE — PHYSICAL EXAM
[Chaperone Present] : A chaperone was present in the examining room during all aspects of the physical examination [No Acute Distress] : in no acute distress [Well developed] : well developed [Well Nourished] : ~L well nourished [Good Hygeine] : demonstrates good hygeine [Oriented x3] : oriented to person, place, and time [Normal Memory] : ~T memory was ~L unimpaired [Normal Mood/Affect] : mood and affect are normal [Anxiety] : patient is not anxious [Respirations regular] : ~T respiratory rate was regular [Normal Appearance] : general appearance was normal [Atrophy] : atrophy [Normal] : normal

## 2024-04-17 NOTE — DISCUSSION/SUMMARY
[FreeTextEntry1] : Marah was refitted with Gellhorn 2 3/4 pessary. Pt would like to continue with current management.  Continue vaginal estrogen 3 times week. Return for pessary check in 3 months.  Aware to call the office with any questions or concerns.

## 2024-04-17 NOTE — HISTORY OF PRESENT ILLNESS
[FreeTextEntry1] : Pt returns to office today due to pessary falling out again. Pessary fell out during a bowel movement. She reports that she was not straining/constipated.

## 2024-04-25 ENCOUNTER — APPOINTMENT (OUTPATIENT)
Dept: UROGYNECOLOGY | Facility: CLINIC | Age: 64
End: 2024-04-25
Payer: COMMERCIAL

## 2024-04-25 DIAGNOSIS — N81.11 CYSTOCELE, MIDLINE: ICD-10-CM

## 2024-04-25 DIAGNOSIS — Z46.89 ENCOUNTER FOR FITTING AND ADJUSTMENT OF OTHER SPECIFIED DEVICES: ICD-10-CM

## 2024-04-25 DIAGNOSIS — N81.2 INCOMPLETE UTEROVAGINAL PROLAPSE: ICD-10-CM

## 2024-04-25 PROCEDURE — 99203 OFFICE O/P NEW LOW 30 MIN: CPT

## 2024-04-25 PROCEDURE — 99459 PELVIC EXAMINATION: CPT

## 2024-04-25 NOTE — PROCEDURE
[Good Fit] : fits well [Refit] : refit needed [Erosion] : evidence of erosion [Erythema] : no erythema [Discharge] : no vaginal discharge [Infection] : no evidence of infection [Pessary Inserted] : inserted [Mild] : mild bleeding [Hemostatic Agent used (Silver Nitrate)] : a hemostatic agent (Silver Nitrate) was used to resolve bleeding [Medication Review] : Medicaiton Review: Patient verbalizes understanding of risks and benefits [Bowel Management] : Bowel Management: patient verbalizes understanding of daily dietary fiber intake [de-identified] : 2 1/2 reinserted due to pt discomfort. [FreeTextEntry1] : 2 1/2 [de-identified] : Posterior superficial abrasion with bleeding. [FreeTextEntry3] : Continue vaginal estrogen cream three times weekly [FreeTextEntry4] : Continue MiraLAX as needed

## 2024-04-25 NOTE — HISTORY OF PRESENT ILLNESS
[FreeTextEntry1] : Pt presents for pessary follow-up. Gellhorkeaton upsized last visit, pt reports pinching sensation when she is walking/standing. No issues while she is sitting or having a bowel movement.

## 2024-04-25 NOTE — PHYSICAL EXAM
[Chaperone Present] : A chaperone was present in the examining room during all aspects of the physical examination [[id number]] : A chaperone was present during the pelvic exam. [FreeTextEntry2] : Mignon [No Acute Distress] : in no acute distress [Well developed] : well developed [Well Nourished] : ~L well nourished [Good Hygeine] : demonstrates good hygeine [Oriented x3] : oriented to person, place, and time [Normal Memory] : ~T memory was ~L unimpaired [Normal Mood/Affect] : mood and affect are normal [Anxiety] : patient is not anxious [Respirations regular] : ~T respiratory rate was regular [Normal Appearance] : general appearance was normal [Atrophy] : atrophy [Normal] : normal

## 2024-04-25 NOTE — DISCUSSION/SUMMARY
[FreeTextEntry1] : #POP/vaginal atrophy: - Pt with stage III POP, Gellhorn LS 2 1/2 pessary reinserted today (replaced from SS 2 3/4, which was causing pain and discomfort). - Continue current management, precautions reviewed. - Continue vaginal estrogen cream 3 times per week  RTO in 3 months or earlier PRN. She is aware to call the office with any issues or concerns.

## 2024-06-08 NOTE — REASON FOR VISIT
[Initial Visit ___] : an initial visit for [unfilled] [Source: ______] : History obtained from [unfilled]  OB Discharge Instructions

## 2024-07-30 ENCOUNTER — APPOINTMENT (OUTPATIENT)
Dept: UROGYNECOLOGY | Facility: CLINIC | Age: 64
End: 2024-07-30
Payer: COMMERCIAL

## 2024-07-30 DIAGNOSIS — Z46.89 ENCOUNTER FOR FITTING AND ADJUSTMENT OF OTHER SPECIFIED DEVICES: ICD-10-CM

## 2024-07-30 PROCEDURE — 99213 OFFICE O/P EST LOW 20 MIN: CPT

## 2024-07-30 NOTE — HISTORY OF PRESENT ILLNESS
[FreeTextEntry1] : Pt presents for pessary follow-up. Reports doing well. Pessary fell out twice since last visit and she was able to reinsert it without any issues. Denies any complaints today.

## 2024-07-30 NOTE — PHYSICAL EXAM
[Chaperone Present] : A chaperone was present in the examining room during all aspects of the physical examination [FreeTextEntry2] : Mignon [No Acute Distress] : in no acute distress [Well developed] : well developed [Well Nourished] : ~L well nourished [Good Hygeine] : demonstrates good hygeine [Oriented x3] : oriented to person, place, and time [Normal Memory] : ~T memory was ~L unimpaired [Normal Mood/Affect] : mood and affect are normal [Anxiety] : patient is not anxious [Respirations regular] : ~T respiratory rate was regular [Normal Appearance] : general appearance was normal [Atrophy] : atrophy [Normal] : normal [FreeTextEntry4] : mild irritation of posterior vaginal epithelium.

## 2024-07-30 NOTE — PROCEDURE
[Good Fit] : fits well [Refit] : refit needed [Erosion] : evidence of erosion [Erythema] : no erythema [Discharge] : no vaginal discharge [Infection] : no evidence of infection [Pessary Inserted] : inserted [Mild] : mild bleeding [Hemostatic Agent used (Silver Nitrate)] : a hemostatic agent (Silver Nitrate) was used to resolve bleeding [Medication Review] : Medicaiton Review: Patient verbalizes understanding of risks and benefits [Bowel Management] : Bowel Management: patient verbalizes understanding of daily dietary fiber intake [FreeTextEntry1] : 2 1/2 [de-identified] : Posterior superficial abrasion with bleeding. [FreeTextEntry3] : Continue vaginal estrogen cream three times weekly [FreeTextEntry4] : Continue MiraLAX as needed

## 2024-07-30 NOTE — DISCUSSION/SUMMARY
[FreeTextEntry1] : #POP/vaginal atrophy: - Doing well with Gellhorn LS 2 1/2 pessary - Continue current management, precautions reviewed. - Continue vaginal estrogen cream 3 times per week - Patient will start self-care  RTO in 6 months or earlier PRN. She is aware to call the office with any issues or concerns.

## 2025-01-28 ENCOUNTER — APPOINTMENT (OUTPATIENT)
Dept: UROGYNECOLOGY | Facility: CLINIC | Age: 65
End: 2025-01-28

## 2025-01-28 DIAGNOSIS — Z46.89 ENCOUNTER FOR FITTING AND ADJUSTMENT OF OTHER SPECIFIED DEVICES: ICD-10-CM

## 2025-01-28 PROCEDURE — 99213 OFFICE O/P EST LOW 20 MIN: CPT

## 2025-07-30 ENCOUNTER — APPOINTMENT (OUTPATIENT)
Facility: CLINIC | Age: 65
End: 2025-07-30

## 2025-09-03 ENCOUNTER — APPOINTMENT (OUTPATIENT)
Facility: CLINIC | Age: 65
End: 2025-09-03
Payer: COMMERCIAL

## 2025-09-03 VITALS
OXYGEN SATURATION: 97 % | WEIGHT: 144 LBS | HEART RATE: 73 BPM | HEIGHT: 61 IN | DIASTOLIC BLOOD PRESSURE: 75 MMHG | SYSTOLIC BLOOD PRESSURE: 157 MMHG | BODY MASS INDEX: 27.19 KG/M2

## 2025-09-03 DIAGNOSIS — Z46.89 ENCOUNTER FOR FITTING AND ADJUSTMENT OF OTHER SPECIFIED DEVICES: ICD-10-CM

## 2025-09-03 PROCEDURE — 99213 OFFICE O/P EST LOW 20 MIN: CPT

## 2025-09-03 PROCEDURE — 99459 PELVIC EXAMINATION: CPT

## 2025-09-03 PROCEDURE — A4561: CPT

## 2025-09-16 ENCOUNTER — APPOINTMENT (OUTPATIENT)
Dept: UROGYNECOLOGY | Facility: CLINIC | Age: 65
End: 2025-09-16
Payer: COMMERCIAL

## 2025-09-16 VITALS
HEART RATE: 78 BPM | WEIGHT: 143 LBS | DIASTOLIC BLOOD PRESSURE: 88 MMHG | BODY MASS INDEX: 27 KG/M2 | HEIGHT: 61 IN | SYSTOLIC BLOOD PRESSURE: 138 MMHG

## 2025-09-16 DIAGNOSIS — N81.11 CYSTOCELE, MIDLINE: ICD-10-CM

## 2025-09-16 PROCEDURE — 99459 PELVIC EXAMINATION: CPT

## 2025-09-16 PROCEDURE — 99213 OFFICE O/P EST LOW 20 MIN: CPT

## 2025-09-16 PROCEDURE — G2211 COMPLEX E/M VISIT ADD ON: CPT | Mod: NC

## (undated) DEVICE — GLV 7 PROTEXIS

## (undated) DEVICE — GLV 7.5 PROTEXIS

## (undated) DEVICE — SUT POLYSORB 2-0 30" V-20 UNDYED

## (undated) DEVICE — SUT MONOSOF 5-0 18" C-13

## (undated) DEVICE — DRSG STOCKINETTE IMPERVIOUS XL

## (undated) DEVICE — DRILL BIT SYNTHES ORTHO QC 2X100MM

## (undated) DEVICE — DRSG ADAPTIC 3X8"

## (undated) DEVICE — DRAPE TOWEL BLUE 17" X 24"

## (undated) DEVICE — BLADE FLAT  26 X  6

## (undated) DEVICE — SOLIDIFIER CANN EXPRESS 3K

## (undated) DEVICE — POSITIONER FOAM HEAD CRADLE

## (undated) DEVICE — MARKER SKIN WRITE SITE PLUS

## (undated) DEVICE — DRILL BIT SYNTHES ORTHO QC 1.5X85MM

## (undated) DEVICE — WRAP COMPRESSION CALF MED

## (undated) DEVICE — PACK LOWER EXTREMITY

## (undated) DEVICE — GLV 6.5 PROTEXIS

## (undated) DEVICE — SUT POLYSORB 4-0 30" C-13 UNDYED

## (undated) DEVICE — SOL IRR POUR NS 0.9% 500ML

## (undated) DEVICE — NDL HYPO REGULAR BEVEL 25G X 1.5"

## (undated) DEVICE — DRSG WEBRIL 4"

## (undated) DEVICE — SUT MONOSOF 4-0 18" C-13

## (undated) DEVICE — SYR LUER LOK 10CC

## (undated) DEVICE — SHOE  POSTOP SOFTIE DARCO W-LG

## (undated) DEVICE — GOWN TRIMAX XXL

## (undated) DEVICE — SUT POLYSORB 3-0 18" C-13 UNDYED

## (undated) DEVICE — GOWN LG W TOWEL

## (undated) DEVICE — DRILL BIT SYNTHES ORTHO QC 2.7X100MM

## (undated) DEVICE — BLADE RECIP CROSS CUT SM

## (undated) DEVICE — SAW BLADE MICROAIRE SAGITTAL 9.4MMX25.4MMX0.6MM

## (undated) DEVICE — SHOE  POSTOP SOFTIE DARCO M-LG

## (undated) DEVICE — DRAPE INSTRUMENT POUCH

## (undated) DEVICE — SHOE  POSTOP SOFTIE DARCO M-SM

## (undated) DEVICE — ELCTR EDGE BOVIE INSULATED BLADE TIP 2.75"

## (undated) DEVICE — BLADE SAFETY LOCK #15

## (undated) DEVICE — CUFF TOURNIQUET 18" DUAL PORT W PLC

## (undated) DEVICE — GLV 7.5 ESTEEM BLUE

## (undated) DEVICE — PIN DISPENSING SPIKE MINI

## (undated) DEVICE — CANISTER SUCTION LID GUARD 3000CC

## (undated) DEVICE — BLANKET WARMER UPPER ADULT

## (undated) DEVICE — SHOE  POSTOP SOFTIE DARCO M-M

## (undated) DEVICE — PACK FOOT CUST

## (undated) DEVICE — GLV 6 PROTEXIS

## (undated) DEVICE — PREP CHLORAPREP ORANGE 2PCT 26ML

## (undated) DEVICE — ELCTR GROUNDING PAD ADULT COVIDIEN